# Patient Record
Sex: FEMALE | Race: BLACK OR AFRICAN AMERICAN | NOT HISPANIC OR LATINO | Employment: UNEMPLOYED | URBAN - NONMETROPOLITAN AREA
[De-identification: names, ages, dates, MRNs, and addresses within clinical notes are randomized per-mention and may not be internally consistent; named-entity substitution may affect disease eponyms.]

---

## 2023-04-28 ENCOUNTER — ANESTHESIA EVENT (OUTPATIENT)
Dept: PERIOP | Facility: HOSPITAL | Age: 18
End: 2023-04-28
Payer: COMMERCIAL

## 2023-04-28 ENCOUNTER — HOSPITAL ENCOUNTER (EMERGENCY)
Facility: HOSPITAL | Age: 18
Discharge: HOME | End: 2023-04-29
Attending: EMERGENCY MEDICINE
Payer: COMMERCIAL

## 2023-04-28 ENCOUNTER — APPOINTMENT (OUTPATIENT)
Dept: ULTRASOUND IMAGING | Facility: HOSPITAL | Age: 18
End: 2023-04-28
Payer: COMMERCIAL

## 2023-04-28 ENCOUNTER — PREP FOR SURGERY (OUTPATIENT)
Dept: OTHER | Facility: HOSPITAL | Age: 18
End: 2023-04-28
Payer: COMMERCIAL

## 2023-04-28 ENCOUNTER — ANESTHESIA (OUTPATIENT)
Dept: PERIOP | Facility: HOSPITAL | Age: 18
End: 2023-04-28
Payer: COMMERCIAL

## 2023-04-28 DIAGNOSIS — O36.80X0 PREGNANCY OF UNKNOWN ANATOMIC LOCATION: ICD-10-CM

## 2023-04-28 DIAGNOSIS — O00.101 RIGHT TUBAL PREGNANCY WITHOUT INTRAUTERINE PREGNANCY: ICD-10-CM

## 2023-04-28 DIAGNOSIS — K66.1 HEMOPERITONEUM DUE TO RUPTURE OF RIGHT TUBAL ECTOPIC PREGNANCY: ICD-10-CM

## 2023-04-28 DIAGNOSIS — R10.9 ABDOMINAL PAIN DURING PREGNANCY IN FIRST TRIMESTER: Primary | ICD-10-CM

## 2023-04-28 DIAGNOSIS — D64.9 ANEMIA, UNSPECIFIED TYPE: ICD-10-CM

## 2023-04-28 DIAGNOSIS — O36.80X0 PREGNANCY OF UNKNOWN ANATOMIC LOCATION: Primary | ICD-10-CM

## 2023-04-28 DIAGNOSIS — K66.1 HEMOPERITONEUM: ICD-10-CM

## 2023-04-28 DIAGNOSIS — O26.891 ABDOMINAL PAIN DURING PREGNANCY IN FIRST TRIMESTER: Primary | ICD-10-CM

## 2023-04-28 DIAGNOSIS — O00.101 HEMOPERITONEUM DUE TO RUPTURE OF RIGHT TUBAL ECTOPIC PREGNANCY: ICD-10-CM

## 2023-04-28 LAB
ABO GROUP BLD: NORMAL
ABO GROUP BLD: NORMAL
ALBUMIN SERPL-MCNC: 4 G/DL (ref 3.5–5.2)
ALBUMIN/GLOB SERPL: 1.4 G/DL
ALP SERPL-CCNC: 69 U/L (ref 39–117)
ALT SERPL W P-5'-P-CCNC: <5 U/L (ref 1–33)
ANION GAP SERPL CALCULATED.3IONS-SCNC: 10.8 MMOL/L (ref 5–15)
AST SERPL-CCNC: 10 U/L (ref 1–32)
B-HCG UR QL: POSITIVE
BASOPHILS # BLD AUTO: 0.08 10*3/MM3 (ref 0–0.2)
BASOPHILS NFR BLD AUTO: 0.6 % (ref 0–1.5)
BH BB BLOOD EXPIRATION DATE: NORMAL
BH BB BLOOD TYPE BARCODE: 5100
BH BB DISPENSE STATUS: NORMAL
BH BB PRODUCT CODE: NORMAL
BH BB UNIT NUMBER: NORMAL
BILIRUB SERPL-MCNC: 0.3 MG/DL (ref 0–1.2)
BILIRUB UR QL STRIP: NEGATIVE
BLD GP AB SCN SERPL QL: NEGATIVE
BUN SERPL-MCNC: 5 MG/DL (ref 6–20)
BUN/CREAT SERPL: 9.1 (ref 7–25)
CALCIUM SPEC-SCNC: 8.5 MG/DL (ref 8.6–10.5)
CHLORIDE SERPL-SCNC: 104 MMOL/L (ref 98–107)
CLARITY UR: CLEAR
CO2 SERPL-SCNC: 21.2 MMOL/L (ref 22–29)
COLOR UR: YELLOW
CREAT SERPL-MCNC: 0.55 MG/DL (ref 0.57–1)
CROSSMATCH INTERPRETATION: NORMAL
DEPRECATED RDW RBC AUTO: 38.2 FL (ref 37–54)
EGFRCR SERPLBLD CKD-EPI 2021: 134.8 ML/MIN/1.73
EOSINOPHIL # BLD AUTO: 0.22 10*3/MM3 (ref 0–0.4)
EOSINOPHIL NFR BLD AUTO: 1.6 % (ref 0.3–6.2)
ERYTHROCYTE [DISTWIDTH] IN BLOOD BY AUTOMATED COUNT: 12.5 % (ref 12.3–15.4)
GLOBULIN UR ELPH-MCNC: 2.8 GM/DL
GLUCOSE SERPL-MCNC: 114 MG/DL (ref 65–99)
GLUCOSE UR STRIP-MCNC: NEGATIVE MG/DL
HCG INTACT+B SERPL-ACNC: 1739 MIU/ML
HCT VFR BLD AUTO: 26.5 % (ref 34–46.6)
HGB BLD-MCNC: 9.4 G/DL (ref 12–15.9)
HGB UR QL STRIP.AUTO: NEGATIVE
HOLD SPECIMEN: NORMAL
HOLD SPECIMEN: NORMAL
IMM GRANULOCYTES # BLD AUTO: 0.03 10*3/MM3 (ref 0–0.05)
IMM GRANULOCYTES NFR BLD AUTO: 0.2 % (ref 0–0.5)
KETONES UR QL STRIP: NEGATIVE
LEUKOCYTE ESTERASE UR QL STRIP.AUTO: NEGATIVE
LIPASE SERPL-CCNC: 23 U/L (ref 13–60)
LYMPHOCYTES # BLD AUTO: 3.84 10*3/MM3 (ref 0.7–3.1)
LYMPHOCYTES NFR BLD AUTO: 28.8 % (ref 19.6–45.3)
MCH RBC QN AUTO: 30 PG (ref 26.6–33)
MCHC RBC AUTO-ENTMCNC: 35.5 G/DL (ref 31.5–35.7)
MCV RBC AUTO: 84.7 FL (ref 79–97)
MONOCYTES # BLD AUTO: 1.02 10*3/MM3 (ref 0.1–0.9)
MONOCYTES NFR BLD AUTO: 7.6 % (ref 5–12)
NEUTROPHILS NFR BLD AUTO: 61.2 % (ref 42.7–76)
NEUTROPHILS NFR BLD AUTO: 8.16 10*3/MM3 (ref 1.7–7)
NITRITE UR QL STRIP: NEGATIVE
NRBC BLD AUTO-RTO: 0 /100 WBC (ref 0–0.2)
PH UR STRIP.AUTO: 6.5 [PH] (ref 5–8)
PLATELET # BLD AUTO: 253 10*3/MM3 (ref 140–450)
PMV BLD AUTO: 9.4 FL (ref 6–12)
POTASSIUM SERPL-SCNC: 3.4 MMOL/L (ref 3.5–5.2)
PROT SERPL-MCNC: 6.8 G/DL (ref 6–8.5)
PROT UR QL STRIP: NEGATIVE
RBC # BLD AUTO: 3.13 10*6/MM3 (ref 3.77–5.28)
RH BLD: POSITIVE
RH BLD: POSITIVE
SODIUM SERPL-SCNC: 136 MMOL/L (ref 136–145)
SP GR UR STRIP: <=1.005 (ref 1–1.03)
T&S EXPIRATION DATE: NORMAL
UNIT  ABO: NORMAL
UNIT  RH: NORMAL
UROBILINOGEN UR QL STRIP: NORMAL
WBC NRBC COR # BLD: 13.35 10*3/MM3 (ref 3.4–10.8)
WHOLE BLOOD HOLD COAG: NORMAL
WHOLE BLOOD HOLD SPECIMEN: NORMAL

## 2023-04-28 PROCEDURE — 25010000002 PROPOFOL 200 MG/20ML EMULSION: Performed by: NURSE ANESTHETIST, CERTIFIED REGISTERED

## 2023-04-28 PROCEDURE — 81025 URINE PREGNANCY TEST: CPT | Performed by: EMERGENCY MEDICINE

## 2023-04-28 PROCEDURE — 86920 COMPATIBILITY TEST SPIN: CPT

## 2023-04-28 PROCEDURE — 86901 BLOOD TYPING SEROLOGIC RH(D): CPT | Performed by: EMERGENCY MEDICINE

## 2023-04-28 PROCEDURE — 76817 TRANSVAGINAL US OBSTETRIC: CPT

## 2023-04-28 PROCEDURE — 86900 BLOOD TYPING SEROLOGIC ABO: CPT | Performed by: EMERGENCY MEDICINE

## 2023-04-28 PROCEDURE — 25010000002 HYDROMORPHONE PER 4 MG: Performed by: NURSE ANESTHETIST, CERTIFIED REGISTERED

## 2023-04-28 PROCEDURE — 80053 COMPREHEN METABOLIC PANEL: CPT | Performed by: EMERGENCY MEDICINE

## 2023-04-28 PROCEDURE — 81003 URINALYSIS AUTO W/O SCOPE: CPT | Performed by: EMERGENCY MEDICINE

## 2023-04-28 PROCEDURE — 25010000002 SUCCINYLCHOLINE PER 20 MG: Performed by: NURSE ANESTHETIST, CERTIFIED REGISTERED

## 2023-04-28 PROCEDURE — 86901 BLOOD TYPING SEROLOGIC RH(D): CPT

## 2023-04-28 PROCEDURE — 99285 EMERGENCY DEPT VISIT HI MDM: CPT

## 2023-04-28 PROCEDURE — 25010000002 FENTANYL CITRATE (PF) 100 MCG/2ML SOLUTION: Performed by: NURSE ANESTHETIST, CERTIFIED REGISTERED

## 2023-04-28 PROCEDURE — 85025 COMPLETE CBC W/AUTO DIFF WBC: CPT | Performed by: EMERGENCY MEDICINE

## 2023-04-28 PROCEDURE — 84702 CHORIONIC GONADOTROPIN TEST: CPT | Performed by: EMERGENCY MEDICINE

## 2023-04-28 PROCEDURE — 86900 BLOOD TYPING SEROLOGIC ABO: CPT

## 2023-04-28 PROCEDURE — 83690 ASSAY OF LIPASE: CPT | Performed by: EMERGENCY MEDICINE

## 2023-04-28 PROCEDURE — 25010000002 DEXAMETHASONE PER 1 MG: Performed by: NURSE ANESTHETIST, CERTIFIED REGISTERED

## 2023-04-28 PROCEDURE — 86850 RBC ANTIBODY SCREEN: CPT | Performed by: EMERGENCY MEDICINE

## 2023-04-28 RX ORDER — HYDROMORPHONE HCL 110MG/55ML
PATIENT CONTROLLED ANALGESIA SYRINGE INTRAVENOUS AS NEEDED
Status: DISCONTINUED | OUTPATIENT
Start: 2023-04-28 | End: 2023-04-28 | Stop reason: SURG

## 2023-04-28 RX ORDER — IBUPROFEN 800 MG/1
800 TABLET ORAL EVERY 6 HOURS PRN
Qty: 30 TABLET | Refills: 0 | Status: SHIPPED | OUTPATIENT
Start: 2023-04-28

## 2023-04-28 RX ORDER — PROPOFOL 10 MG/ML
INJECTION, EMULSION INTRAVENOUS AS NEEDED
Status: DISCONTINUED | OUTPATIENT
Start: 2023-04-28 | End: 2023-04-28 | Stop reason: SURG

## 2023-04-28 RX ORDER — SODIUM CHLORIDE 0.9 % (FLUSH) 0.9 %
10 SYRINGE (ML) INJECTION AS NEEDED
Status: DISCONTINUED | OUTPATIENT
Start: 2023-04-28 | End: 2023-04-29 | Stop reason: HOSPADM

## 2023-04-28 RX ORDER — SUCCINYLCHOLINE CHLORIDE 20 MG/ML
INJECTION INTRAMUSCULAR; INTRAVENOUS AS NEEDED
Status: DISCONTINUED | OUTPATIENT
Start: 2023-04-28 | End: 2023-04-28 | Stop reason: SURG

## 2023-04-28 RX ORDER — BUPIVACAINE HCL/0.9 % NACL/PF 0.125 %
PLASTIC BAG, INJECTION (ML) EPIDURAL AS NEEDED
Status: DISCONTINUED | OUTPATIENT
Start: 2023-04-28 | End: 2023-04-28 | Stop reason: SURG

## 2023-04-28 RX ORDER — SODIUM CHLORIDE 9 MG/ML
40 INJECTION, SOLUTION INTRAVENOUS AS NEEDED
Status: DISCONTINUED | OUTPATIENT
Start: 2023-04-28 | End: 2023-04-28 | Stop reason: HOSPADM

## 2023-04-28 RX ORDER — OXYCODONE HYDROCHLORIDE 5 MG/1
5 TABLET ORAL EVERY 4 HOURS PRN
Qty: 10 TABLET | Refills: 0 | Status: SHIPPED | OUTPATIENT
Start: 2023-04-28

## 2023-04-28 RX ORDER — NEOSTIGMINE METHYLSULFATE 5 MG/5 ML
SYRINGE (ML) INTRAVENOUS AS NEEDED
Status: DISCONTINUED | OUTPATIENT
Start: 2023-04-28 | End: 2023-04-28 | Stop reason: SURG

## 2023-04-28 RX ORDER — BUPIVACAINE HYDROCHLORIDE 2.5 MG/ML
INJECTION, SOLUTION EPIDURAL; INFILTRATION; INTRACAUDAL AS NEEDED
Status: DISCONTINUED | OUTPATIENT
Start: 2023-04-28 | End: 2023-04-28 | Stop reason: HOSPADM

## 2023-04-28 RX ORDER — SODIUM CHLORIDE 0.9 % (FLUSH) 0.9 %
3 SYRINGE (ML) INJECTION EVERY 12 HOURS SCHEDULED
Status: DISCONTINUED | OUTPATIENT
Start: 2023-04-28 | End: 2023-04-28 | Stop reason: HOSPADM

## 2023-04-28 RX ORDER — SODIUM CHLORIDE 0.9 % (FLUSH) 0.9 %
3 SYRINGE (ML) INJECTION EVERY 12 HOURS SCHEDULED
Status: CANCELLED | OUTPATIENT
Start: 2023-04-28

## 2023-04-28 RX ORDER — FENTANYL CITRATE 50 UG/ML
INJECTION, SOLUTION INTRAMUSCULAR; INTRAVENOUS AS NEEDED
Status: DISCONTINUED | OUTPATIENT
Start: 2023-04-28 | End: 2023-04-28 | Stop reason: SURG

## 2023-04-28 RX ORDER — ROCURONIUM BROMIDE 10 MG/ML
INJECTION, SOLUTION INTRAVENOUS AS NEEDED
Status: DISCONTINUED | OUTPATIENT
Start: 2023-04-28 | End: 2023-04-28 | Stop reason: SURG

## 2023-04-28 RX ORDER — ACETAMINOPHEN 500 MG
1000 TABLET ORAL EVERY 6 HOURS PRN
Qty: 60 TABLET | Refills: 0 | Status: SHIPPED | OUTPATIENT
Start: 2023-04-28

## 2023-04-28 RX ORDER — SODIUM CHLORIDE 9 MG/ML
40 INJECTION, SOLUTION INTRAVENOUS AS NEEDED
Status: CANCELLED | OUTPATIENT
Start: 2023-04-28

## 2023-04-28 RX ORDER — SODIUM CHLORIDE 0.9 % (FLUSH) 0.9 %
10 SYRINGE (ML) INJECTION AS NEEDED
Status: CANCELLED | OUTPATIENT
Start: 2023-04-28

## 2023-04-28 RX ORDER — DEXAMETHASONE SODIUM PHOSPHATE 4 MG/ML
INJECTION, SOLUTION INTRA-ARTICULAR; INTRALESIONAL; INTRAMUSCULAR; INTRAVENOUS; SOFT TISSUE AS NEEDED
Status: DISCONTINUED | OUTPATIENT
Start: 2023-04-28 | End: 2023-04-28 | Stop reason: SURG

## 2023-04-28 RX ORDER — LIDOCAINE HYDROCHLORIDE 20 MG/ML
INJECTION, SOLUTION INTRAVENOUS AS NEEDED
Status: DISCONTINUED | OUTPATIENT
Start: 2023-04-28 | End: 2023-04-28 | Stop reason: SURG

## 2023-04-28 RX ORDER — ACETAMINOPHEN 500 MG
1000 TABLET ORAL ONCE
Status: COMPLETED | OUTPATIENT
Start: 2023-04-28 | End: 2023-04-28

## 2023-04-28 RX ORDER — SODIUM CHLORIDE 0.9 % (FLUSH) 0.9 %
10 SYRINGE (ML) INJECTION AS NEEDED
Status: DISCONTINUED | OUTPATIENT
Start: 2023-04-28 | End: 2023-04-28 | Stop reason: HOSPADM

## 2023-04-28 RX ORDER — ACETAMINOPHEN AND CODEINE PHOSPHATE 300; 30 MG/1; MG/1
2 TABLET ORAL EVERY 4 HOURS PRN
Status: DISCONTINUED | OUTPATIENT
Start: 2023-04-28 | End: 2023-04-29 | Stop reason: HOSPADM

## 2023-04-28 RX ORDER — ONDANSETRON 2 MG/ML
4 INJECTION INTRAMUSCULAR; INTRAVENOUS ONCE AS NEEDED
Status: DISCONTINUED | OUTPATIENT
Start: 2023-04-28 | End: 2023-04-29 | Stop reason: HOSPADM

## 2023-04-28 RX ORDER — SODIUM CHLORIDE, SODIUM LACTATE, POTASSIUM CHLORIDE, CALCIUM CHLORIDE 600; 310; 30; 20 MG/100ML; MG/100ML; MG/100ML; MG/100ML
INJECTION, SOLUTION INTRAVENOUS CONTINUOUS PRN
Status: DISCONTINUED | OUTPATIENT
Start: 2023-04-28 | End: 2023-04-28 | Stop reason: SURG

## 2023-04-28 RX ORDER — DICYCLOMINE HYDROCHLORIDE 10 MG/1
10 CAPSULE ORAL
COMMUNITY

## 2023-04-28 RX ADMIN — SUCCINYLCHOLINE CHLORIDE 100 MG: 20 INJECTION, SOLUTION INTRAMUSCULAR; INTRAVENOUS at 19:17

## 2023-04-28 RX ADMIN — Medication 200 MCG: at 19:38

## 2023-04-28 RX ADMIN — Medication 3 MG: at 20:02

## 2023-04-28 RX ADMIN — SODIUM CHLORIDE, POTASSIUM CHLORIDE, SODIUM LACTATE AND CALCIUM CHLORIDE: 600; 310; 30; 20 INJECTION, SOLUTION INTRAVENOUS at 19:11

## 2023-04-28 RX ADMIN — DEXAMETHASONE SODIUM PHOSPHATE 8 MG: 4 INJECTION, SOLUTION INTRAMUSCULAR; INTRAVENOUS at 19:24

## 2023-04-28 RX ADMIN — SODIUM CHLORIDE 1000 ML: 9 INJECTION, SOLUTION INTRAVENOUS at 16:12

## 2023-04-28 RX ADMIN — ACETAMINOPHEN 1000 MG: 500 TABLET, FILM COATED ORAL at 16:12

## 2023-04-28 RX ADMIN — ROCURONIUM BROMIDE 5 MG: 10 INJECTION INTRAVENOUS at 19:17

## 2023-04-28 RX ADMIN — Medication 200 MCG: at 19:24

## 2023-04-28 RX ADMIN — ROCURONIUM BROMIDE 25 MG: 10 INJECTION INTRAVENOUS at 19:23

## 2023-04-28 RX ADMIN — HYDROMORPHONE HYDROCHLORIDE 1 MG: 2 INJECTION, SOLUTION INTRAMUSCULAR; INTRAVENOUS; SUBCUTANEOUS at 19:33

## 2023-04-28 RX ADMIN — HYDROMORPHONE HYDROCHLORIDE 1 MG: 2 INJECTION, SOLUTION INTRAMUSCULAR; INTRAVENOUS; SUBCUTANEOUS at 19:57

## 2023-04-28 RX ADMIN — Medication 200 MCG: at 19:42

## 2023-04-28 RX ADMIN — FENTANYL CITRATE 100 MCG: 50 INJECTION INTRAMUSCULAR; INTRAVENOUS at 19:17

## 2023-04-28 RX ADMIN — LIDOCAINE HYDROCHLORIDE 80 MG: 20 INJECTION, SOLUTION INTRAVENOUS at 19:17

## 2023-04-28 RX ADMIN — GLYCOPYRROLATE 0.4 MG: 0.2 INJECTION, SOLUTION INTRAMUSCULAR; INTRAVENOUS at 20:02

## 2023-04-28 RX ADMIN — PROPOFOL 150 MG: 10 INJECTION, EMULSION INTRAVENOUS at 19:17

## 2023-04-28 NOTE — ANESTHESIA PREPROCEDURE EVALUATION
Anesthesia Evaluation     Patient summary reviewed and Nursing notes reviewed   NPO Solid Status: > 8 hours  NPO Liquid Status: > 4 hours           Airway   Mallampati: II  TM distance: >3 FB  Neck ROM: full  No difficulty expected  Dental - normal exam     Pulmonary     breath sounds clear to auscultation  (+) asthma,  Cardiovascular - negative cardio ROS    Rhythm: regular  Rate: normal        Neuro/Psych- negative ROS  GI/Hepatic/Renal/Endo - negative ROS     Musculoskeletal (-) negative ROS    Abdominal    Substance History - negative use     OB/GYN    (+) Pregnant (ruptured ectopic),         Other - negative ROS                     Anesthesia Plan    ASA 2 - emergent     general   Rapid sequence  intravenous induction     Anesthetic plan, risks, benefits, and alternatives have been provided, discussed and informed consent has been obtained with: patient.  Pre-procedure education provided  Plan discussed with CRNA.        CODE STATUS:

## 2023-04-28 NOTE — H&P
GYNECOLOGY HISTORY AND PHYSICAL    CHIEF COMPLAINT: Abdominal pain, early pregnancy    DIAGNOSIS:  Pregnancy of unknown location  Suspected ruptured ectopic pregnancy    ASSESSMENT/PLAN     18 y.o.  female who presents with abdominal/ pelvic pain of approximately 3 days duration in early pregnancy, with ultrasound findings concerning for possible ruptured ectopic pregnancy.    # Pregnancy of unknown location  - Initially presented to outside ED 23, Hcg 937, H/H 12.3/35, no evidence of intrauterine pregnancy or adnexal mass, small physiologic free fluid present. Discharged with instructions to follow up in 48 hours for Hcg.  - Presented to [facility] ED today with continued abdominal pain. Hcg has risen appropriately (1739), however H/H has dropped to 9.4/26.5. Ultrasound reveals a small, irregular fluid collection in the uterus, possibly consistent with an early GS, however no FP or YS is seen. Large complex fluid collection now present in the cul de sac, concerning for hemoperitoneum.  - I discussed the risks/ benefits with the patient extensively - given concern for hemoperitoneum and her ongoing pain, I suspect ectopic pregnancy and therefore recommend diagnostic laparoscopy with possible unilateral salpingectomy /oophorectomy as indicated. We discussed the possibility that a pregnancy is not located on diagnostic laparoscopy, in which case she will be admitted for obs overnight and consideration of methotrexate. We also discussed the possibility that this is still an early intrauterine pregnancy, in which case there are small risks of exposure to anesthesia for the fetus. However, the risks of ruptured ectopic outweigh these risks and thus I recommend proceeding to the OR. Magnolia is in agreement with this plan and would like to proceed with surgery. All questions answered, prep for case placed and OR team made aware.    HISTORY OF PRESENT ILLNESS     18 y.o.  female who presents with abdominal pain of about  3 days duration. She initially presented to the [place] ED in [place] on 23 with abdominal pain. She reported an LMP of 3/26/23, putting her at approximately 4 weeks gestation. She indicated to ER providers that she did have unprotected intercourse about one month ago and used Plan B at that time. This was not a planned pregnancy. Her UPT was positive and an Hcg was obtained, which resulted at 937. Ultrasound did not reveal an IUP, adnexal masses or significant free fluid (trace physiologic free fluid noted). She was hemodynamically stable and thus discharged with return precautions and instructions to repeat her Hcg in 48 hours. Today, she reports her pain has been ongoing. She has not had any vaginal bleeding throughout the last few days. Her pain is in her lower abdomen, on the right side more than the left.     REVIEW OF SYSTEMS: A complete review of systems was performed and was specifically negative for headache, changes in vision, RUQ pain, shortness of breath, chest pain, lower extremity edema and dysuria.     HISTORY:  Obstetrical History:  OB History    Para Term  AB Living   1             SAB IAB Ectopic Molar Multiple Live Births                    # Outcome Date GA Lbr Rodrick/2nd Weight Sex Delivery Anes PTL Lv   1 Current                Past Medical History:  History reviewed. No pertinent past medical history.    Past Surgical History:  History reviewed. No pertinent surgical history.    Social History:       Family History  History reviewed. No pertinent family history.     Allergies: No Known Allergies    No current facility-administered medications on file prior to encounter.     Current Outpatient Medications on File Prior to Encounter   Medication Sig Dispense Refill   • dicyclomine (BENTYL) 10 MG capsule Take 1 capsule by mouth 4 (Four) Times a Day Before Meals & at Bedtime.         OBJECTIVE   VITALS:  Temp:  [97.8 °F (36.6 °C)] 97.8 °F (36.6 °C)  Heart Rate:  [108-133]  108  Resp:  [18] 18  BP: (105-112)/(58-65) 105/58    PHYSICAL EXAM:  General Appearance: alert, pleasant, appears stated age, interactive and cooperative  Head: normocephalic, without obvious abnormality and atraumatic  Neck: suppple, normal range of motion  Lungs: respirations regular, even and unlabored, clear to auscultation bilaterally   Heart: regular rhythm and normal rate, no murmur, gallop, or rubs  Abdomen: no masses, soft, non-distended. Tender to palpation in bilateral lower quadrants, R > L, with (+) rebound tenderness.  Extremities: moves extremities well, no edema, no cyanosis and no redness  Skin: no bleeding, bruising or rash and no lesions noted  Neurologic: Cranial Nerves 2 - 12 grossly intact   Psych: normal mood and affect, oriented to person, time and place, thought content organized and appropriate judgment    DIAGNOSTIC STUDIES:  Results from last 7 days   Lab Units 04/28/23  1513   WBC 10*3/mm3 13.35*   HEMOGLOBIN g/dL 9.4*   HEMATOCRIT % 26.5*   PLATELETS 10*3/mm3 253   SODIUM mmol/L 136   POTASSIUM mmol/L 3.4*   CHLORIDE mmol/L 104   CO2 mmol/L 21.2*   BUN mg/dL 5*   CREATININE mg/dL 0.55*   GLUCOSE mg/dL 114*   CALCIUM mg/dL 8.5*   AST (SGOT) U/L 10   ALT (SGPT) U/L <5       Recent Results (from the past 24 hour(s))   Comprehensive Metabolic Panel    Collection Time: 04/28/23  3:13 PM    Specimen: Blood   Result Value Ref Range    Glucose 114 (H) 65 - 99 mg/dL    BUN 5 (L) 6 - 20 mg/dL    Creatinine 0.55 (L) 0.57 - 1.00 mg/dL    Sodium 136 136 - 145 mmol/L    Potassium 3.4 (L) 3.5 - 5.2 mmol/L    Chloride 104 98 - 107 mmol/L    CO2 21.2 (L) 22.0 - 29.0 mmol/L    Calcium 8.5 (L) 8.6 - 10.5 mg/dL    Total Protein 6.8 6.0 - 8.5 g/dL    Albumin 4.0 3.5 - 5.2 g/dL    ALT (SGPT) <5 1 - 33 U/L    AST (SGOT) 10 1 - 32 U/L    Alkaline Phosphatase 69 39 - 117 U/L    Total Bilirubin 0.3 0.0 - 1.2 mg/dL    Globulin 2.8 gm/dL    A/G Ratio 1.4 g/dL    BUN/Creatinine Ratio 9.1 7.0 - 25.0    Anion Gap  10.8 5.0 - 15.0 mmol/L    eGFR 134.8 >60.0 mL/min/1.73   Lipase    Collection Time: 04/28/23  3:13 PM    Specimen: Blood   Result Value Ref Range    Lipase 23 13 - 60 U/L   Urinalysis With Microscopic If Indicated (No Culture) - Urine, Clean Catch    Collection Time: 04/28/23  3:13 PM    Specimen: Urine, Clean Catch   Result Value Ref Range    Color, UA Yellow Yellow, Straw    Appearance, UA Clear Clear    pH, UA 6.5 5.0 - 8.0    Specific Gravity, UA <=1.005 1.005 - 1.030    Glucose, UA Negative Negative    Ketones, UA Negative Negative    Bilirubin, UA Negative Negative    Blood, UA Negative Negative    Protein, UA Negative Negative    Leuk Esterase, UA Negative Negative    Nitrite, UA Negative Negative    Urobilinogen, UA 0.2 E.U./dL 0.2 - 1.0 E.U./dL   Pregnancy, Urine - Urine, Clean Catch    Collection Time: 04/28/23  3:13 PM    Specimen: Urine, Clean Catch   Result Value Ref Range    HCG, Urine QL Positive (A) Negative   Green Top (Gel)    Collection Time: 04/28/23  3:13 PM   Result Value Ref Range    Extra Tube Hold for add-ons.    Lavender Top    Collection Time: 04/28/23  3:13 PM   Result Value Ref Range    Extra Tube hold for add-on    Gold Top - SST    Collection Time: 04/28/23  3:13 PM   Result Value Ref Range    Extra Tube Hold for add-ons.    Light Blue Top    Collection Time: 04/28/23  3:13 PM   Result Value Ref Range    Extra Tube Hold for add-ons.    CBC Auto Differential    Collection Time: 04/28/23  3:13 PM    Specimen: Blood   Result Value Ref Range    WBC 13.35 (H) 3.40 - 10.80 10*3/mm3    RBC 3.13 (L) 3.77 - 5.28 10*6/mm3    Hemoglobin 9.4 (L) 12.0 - 15.9 g/dL    Hematocrit 26.5 (L) 34.0 - 46.6 %    MCV 84.7 79.0 - 97.0 fL    MCH 30.0 26.6 - 33.0 pg    MCHC 35.5 31.5 - 35.7 g/dL    RDW 12.5 12.3 - 15.4 %    RDW-SD 38.2 37.0 - 54.0 fl    MPV 9.4 6.0 - 12.0 fL    Platelets 253 140 - 450 10*3/mm3    Neutrophil % 61.2 42.7 - 76.0 %    Lymphocyte % 28.8 19.6 - 45.3 %    Monocyte % 7.6 5.0 - 12.0 %     Eosinophil % 1.6 0.3 - 6.2 %    Basophil % 0.6 0.0 - 1.5 %    Immature Grans % 0.2 0.0 - 0.5 %    Neutrophils, Absolute 8.16 (H) 1.70 - 7.00 10*3/mm3    Lymphocytes, Absolute 3.84 (H) 0.70 - 3.10 10*3/mm3    Monocytes, Absolute 1.02 (H) 0.10 - 0.90 10*3/mm3    Eosinophils, Absolute 0.22 0.00 - 0.40 10*3/mm3    Basophils, Absolute 0.08 0.00 - 0.20 10*3/mm3    Immature Grans, Absolute 0.03 0.00 - 0.05 10*3/mm3    nRBC 0.0 0.0 - 0.2 /100 WBC   hCG, Quantitative, Pregnancy    Collection Time: 04/28/23  3:13 PM    Specimen: Blood   Result Value Ref Range    HCG Quantitative 1,739.00 mIU/mL       Kacie Briggs MD   Obstetrics and Gynecology  [facility]

## 2023-04-28 NOTE — ANESTHESIA PROCEDURE NOTES
Airway  Urgency: elective    Date/Time: 4/28/2023 7:18 PM  Airway not difficult    General Information and Staff    Patient location during procedure: OR  CRNA/CAA: Jadon Tapia CRNA    Indications and Patient Condition  Indications for airway management: airway protection    Preoxygenated: yes  Mask difficulty assessment: 0 - not attempted    Final Airway Details  Final airway type: endotracheal airway      Successful airway: ETT  Cuffed: yes   Successful intubation technique: direct laryngoscopy and RSI  Endotracheal tube insertion site: oral  Blade: Byers  Blade size: 2  ETT size (mm): 7.0  Cormack-Lehane Classification: grade I - full view of glottis  Placement verified by: chest auscultation and capnometry   Measured from: teeth  ETT/EBT  to teeth (cm): 21  Number of attempts at approach: 1  Assessment: lips, teeth, and gum same as pre-op and atraumatic intubation

## 2023-04-28 NOTE — ED PROVIDER NOTES
HPI: Magnolia Palumbo is a 18 y.o. female who presents to the emergency department complaining of abdominal cramping in pregnancy.  She states that she is about 5 weeks gestation, G1.  She notes that for the last few days she has had severe lower abdominal cramping.  She was seen at the [place] ER 2 days ago, was told to follow-up with OB/GYN but the cramping got worse so she came here.  She has had no bleeding, urinary complaints, fevers, chills, shortness of breath or other complaints.      REVIEW OF SYSTEMS: All other systems reviewed and are negative     PAST MEDICAL HISTORY:   History reviewed. No pertinent past medical history.     FAMILY HISTORY:   History reviewed. No pertinent family history.     SOCIAL HISTORY:   Social History     Socioeconomic History   • Marital status: Single        SURGICAL HISTORY:   History reviewed. No pertinent surgical history.     ALLERGIES: Patient has no known allergies.       PHYSICAL EXAM:   VITAL SIGNS:   Vitals:    04/28/23 1732   BP: 105/58   Pulse:    Resp:    Temp:    SpO2:       CONSTITUTIONAL: Awake, well appearing, nontoxic   HENT: Atraumatic, normocephalic, oral mucosa moist, airway patent. Nares patent without drainage. External ears normal.   EYES: Conjunctivae clear, EOMI, PERRL   NECK: Trachea midline, nontender, supple   CARDIOVASCULAR: Tachycardic, Normal rhythm.  PULMONARY/CHEST: Normal work of breathing. Clear to auscultation, no rhonchi, wheezes, or rales.  ABDOMINAL: Nondistended, soft, mild diffuse tenderness, no rebound or guarding.  NEUROLOGIC: Nonfocal, moves all four extremities, no gross sensory or motor deficits.   EXTREMITIES: No clubbing, cyanosis, or edema   SKIN: Warm, Dry, No erythema, No rash       ED COURSE / MEDICAL DECISION MAKING:     Magnolia Palumbo is a 18 y.o. female who presents to the emergency department for evaluation of abdominal cramping in pregnancy. Well developed, well nourished young female in no distress with exam as above.  Her  vital signs are normal other than some mild tachycardia.  Her abdominal exam is notable for mild diffuse tenderness.  I was able to review results from the ER 2 nights ago, she had ultrasound that revealed gestational sac and no fetal pole. Will check labs, UA, repeat ultrasound. Will give IV fluids. Disposition pending.    Differential diagnosis includes abdominal cramping in pregnancy, dehydration, UTI, miscarriage among other etiologies.    1755  Blood work notable for appropriately rising hCG level precipitous drop in H&H compared to 48 hours ago.  Ultrasound reveals no intrauterine pregnancy, moderate amount of what appears to be hemoperitoneum.  Discussed the case and findings with Dr. Briggs, she is going to see patient in the ER.  Disposition pending.    1820  Patient has been evaluated by Dr. Briggs, given the clinical picture and work-up concern for ruptured ectopic, she is planning to take patient to the OR for laparoscopy.    35 minutes of critical care provided. This time excludes other billable procedures. Time does include preparation of documents, medical consultations, review of old records, and direct bedside care. Patient is at high risk for life-threatening deterioration due to presumed ruptured ectopic, anemia.       Final diagnoses:   Abdominal pain during pregnancy in first trimester   Hemoperitoneum   Anemia, unspecified type        Jonathan Jimenez MD  04/28/23 1825

## 2023-04-29 VITALS
DIASTOLIC BLOOD PRESSURE: 60 MMHG | WEIGHT: 150 LBS | SYSTOLIC BLOOD PRESSURE: 110 MMHG | TEMPERATURE: 97.7 F | RESPIRATION RATE: 16 BRPM | BODY MASS INDEX: 24.99 KG/M2 | HEIGHT: 65 IN | HEART RATE: 105 BPM | OXYGEN SATURATION: 100 %

## 2023-04-29 NOTE — OP NOTE
Julio Palumbo  : [id number]  CSN: [id number]  Date: 2023    Operative Note    Pre-op Diagnosis:  Pregnancy of unknown location   Post-op Diagnosis:  Right tubal ectopic pregnancy   Procedure: Diagnostic laparoscopy, right salpingectomy   Surgeon: Kacie Briggs MD    Surgical assistant: Krystal Daniels was responsible for performing the following activities: Placing Dressing and Held/Positioned Camera and their skilled assistance was necessary for the success of this case.   Anesthesia Staff: CRNA: Jadon Tapia CRNA    Anesthesia: General   OR Staff: Circulator: Coleen Byers RN  Scrub Person: Krystal Daniels   Specimens:  None   Estimated Blood Loss: Approximately 220 cc hemoperitoneum, 5 cc additional EBL from the procedure   Complications: None     Findings:  On entering the abdomen, hemoperitoneum was identified and evacuated. Normal appearing uterus, left fallopian tube and bilateral ovaries. The right fallopian tube was noted to be intact but dilated, with slow, active bleeding draining from the fimbriae. Normal liver edge.    Indications:   Magnolia Palumbo is a 18 y.o.  who presented to the Emergency Department with abdominal pain in early pregnancy. Her workup was notable for an a drop in her blood counts as well as imaging concerning for hemoperitoneum, with no intrauterine pregnancy identified. She was counseled regarding concern for ruptured ectopic pregnancy and offered diagnostic laparoscopy with possible unilateral salpingectomy or oophorectomy and she agreed to proceed.      Procedure:   After the appropriate time out and after adequate dosing of anesthesia, the patient was prepped and draped in the usual sterile fashion. She was placed in the dorsal supine position. A snider catheter had been placed per nursing for drainage during the procedure. A 5 mm infraumbilical incision was made with the scalpel. The abdominal wall was elevated and using a Visiport and the  laparoscope, the abdomen was entered under direct visualization. The abdomen was insufflated with carbon dioxide to a pressure of 15 mmHg. The patient was placed in Trendelenburg position. Two ancillary trocars were placed, a 5 mm trocar in the left lower quadrant and a 10 mm trocar in the right lower quadrant, both lateral to the epigastric vessels, under direct visualization without any complications. The pelvis was explored with the above findings noted. Using the suction  device, the hemoperitoneum was evacuated. The right fallopian tube was then elevated and using the Harmonic scalpel, the underlying mesosalpinx was serially clamped, cauterized and transected to the proximal portion of the tube. The tube was then transected with the Harmonic and placed in the anterior cul de sac. The Harmonic was removed and a specimen retrieval bag was inserted through the right lower quadrant trocar. The specimen was placed in the bag and removed, then handed off the field to be sent to pathology. The abdomen was partially released of carbon dioxide and the site of surgical excision was noted to be hemostatic. The left ancillary and umbilical trocars were then removed under direct visualization and confirmed to be hemostatic. The abdomen was fully desufflated prior to removal of the right ancillary trocar. The trocar sites were all made hemostatic with minimal Bovie cautery. The skin was then closed at all sites using 4-0 Monocryl in a subcuticular fashion. Surgical glue was placed. All instrument and sponge counts were correct at the end of the procedure. The patient tolerated the procedure well and was taken to the postoperative recovery room in stable condition.     Kacie Briggs MD   Obstetrics and Gynecology  [facility]

## 2023-04-29 NOTE — ANESTHESIA POSTPROCEDURE EVALUATION
Patient: Magnolia Palumbo    Procedure Summary     Date: 04/28/23 Room / Location: [facility] OR 4 /  [facility]    Anesthesia Start: 1911 Anesthesia Stop: 2019    Procedure: DIAGNOSTIC LAPAROSCOPY, right salpingectomy (Right: Abdomen) Diagnosis:       Pregnancy of unknown anatomic location      (Pregnancy of unknown anatomic location [O36.80X0])    Surgeons: Kacie Briggs MD Provider: Jadon Tapia CRNA    Anesthesia Type: general ASA Status: 2 - Emergent          Anesthesia Type: No value filed.    Vitals  Vitals Value Taken Time   /64 04/28/23 2100   Temp 97.7 °F (36.5 °C) 04/28/23 2021   Pulse 89 04/28/23 2102   Resp 18 04/28/23 2045   SpO2 99 % 04/28/23 2102   Vitals shown include unvalidated device data.        Post Anesthesia Care and Evaluation    Patient location during evaluation: PACU  Patient participation: complete - patient participated  Level of consciousness: awake and alert  Pain score: 1  Pain management: adequate    Airway patency: patent  Anesthetic complications: No anesthetic complications  PONV Status: none  Cardiovascular status: acceptable  Respiratory status: acceptable  Hydration status: acceptable  No anesthesia care post op

## 2023-05-02 LAB
BH BB BLOOD EXPIRATION DATE: NORMAL
BH BB BLOOD TYPE BARCODE: 5100
BH BB DISPENSE STATUS: NORMAL
BH BB PRODUCT CODE: NORMAL
BH BB UNIT NUMBER: NORMAL
CROSSMATCH INTERPRETATION: NORMAL
REF LAB TEST METHOD: NORMAL
UNIT  ABO: NORMAL
UNIT  RH: NORMAL

## 2023-05-06 ENCOUNTER — HOSPITAL ENCOUNTER (EMERGENCY)
Facility: HOSPITAL | Age: 18
Discharge: HOME | End: 2023-05-07
Attending: EMERGENCY MEDICINE
Payer: COMMERCIAL

## 2023-05-06 DIAGNOSIS — R55 SYNCOPE, UNSPECIFIED SYNCOPE TYPE: Primary | ICD-10-CM

## 2023-05-06 LAB
ALBUMIN SERPL-MCNC: 4 G/DL (ref 3.5–5.2)
ALBUMIN/GLOB SERPL: 1.3 G/DL
ALP SERPL-CCNC: 91 U/L (ref 39–117)
ALT SERPL W P-5'-P-CCNC: 10 U/L (ref 1–33)
ANION GAP SERPL CALCULATED.3IONS-SCNC: 10.5 MMOL/L (ref 5–15)
AST SERPL-CCNC: 13 U/L (ref 1–32)
BASOPHILS # BLD AUTO: 0.08 10*3/MM3 (ref 0–0.2)
BASOPHILS NFR BLD AUTO: 0.7 % (ref 0–1.5)
BILIRUB SERPL-MCNC: 0.3 MG/DL (ref 0–1.2)
BILIRUB UR QL STRIP: NEGATIVE
BUN SERPL-MCNC: 12 MG/DL (ref 6–20)
BUN/CREAT SERPL: 20.3 (ref 7–25)
CALCIUM SPEC-SCNC: 8.9 MG/DL (ref 8.6–10.5)
CHLORIDE SERPL-SCNC: 102 MMOL/L (ref 98–107)
CLARITY UR: CLEAR
CO2 SERPL-SCNC: 24.5 MMOL/L (ref 22–29)
COLOR UR: YELLOW
CREAT SERPL-MCNC: 0.59 MG/DL (ref 0.57–1)
DEPRECATED RDW RBC AUTO: 38.4 FL (ref 37–54)
EGFRCR SERPLBLD CKD-EPI 2021: 132.5 ML/MIN/1.73
EOSINOPHIL # BLD AUTO: 0.31 10*3/MM3 (ref 0–0.4)
EOSINOPHIL NFR BLD AUTO: 2.5 % (ref 0.3–6.2)
ERYTHROCYTE [DISTWIDTH] IN BLOOD BY AUTOMATED COUNT: 12.6 % (ref 12.3–15.4)
GLOBULIN UR ELPH-MCNC: 3.2 GM/DL
GLUCOSE SERPL-MCNC: 142 MG/DL (ref 65–99)
GLUCOSE UR STRIP-MCNC: NEGATIVE MG/DL
HCT VFR BLD AUTO: 29.6 % (ref 34–46.6)
HGB BLD-MCNC: 10.3 G/DL (ref 12–15.9)
HGB UR QL STRIP.AUTO: ABNORMAL
IMM GRANULOCYTES # BLD AUTO: 0.05 10*3/MM3 (ref 0–0.05)
IMM GRANULOCYTES NFR BLD AUTO: 0.4 % (ref 0–0.5)
KETONES UR QL STRIP: NEGATIVE
LEUKOCYTE ESTERASE UR QL STRIP.AUTO: NEGATIVE
LYMPHOCYTES # BLD AUTO: 3.07 10*3/MM3 (ref 0.7–3.1)
LYMPHOCYTES NFR BLD AUTO: 25.2 % (ref 19.6–45.3)
MCH RBC QN AUTO: 29.4 PG (ref 26.6–33)
MCHC RBC AUTO-ENTMCNC: 34.8 G/DL (ref 31.5–35.7)
MCV RBC AUTO: 84.6 FL (ref 79–97)
MONOCYTES # BLD AUTO: 0.84 10*3/MM3 (ref 0.1–0.9)
MONOCYTES NFR BLD AUTO: 6.9 % (ref 5–12)
NEUTROPHILS NFR BLD AUTO: 64.3 % (ref 42.7–76)
NEUTROPHILS NFR BLD AUTO: 7.82 10*3/MM3 (ref 1.7–7)
NITRITE UR QL STRIP: NEGATIVE
NRBC BLD AUTO-RTO: 0 /100 WBC (ref 0–0.2)
PH UR STRIP.AUTO: 6.5 [PH] (ref 5–8)
PLATELET # BLD AUTO: 439 10*3/MM3 (ref 140–450)
PMV BLD AUTO: 8.9 FL (ref 6–12)
POTASSIUM SERPL-SCNC: 3.7 MMOL/L (ref 3.5–5.2)
PROT SERPL-MCNC: 7.2 G/DL (ref 6–8.5)
PROT UR QL STRIP: NEGATIVE
RBC # BLD AUTO: 3.5 10*6/MM3 (ref 3.77–5.28)
SODIUM SERPL-SCNC: 137 MMOL/L (ref 136–145)
SP GR UR STRIP: 1.01 (ref 1–1.03)
TROPONIN T SERPL HS-MCNC: <6 NG/L
UROBILINOGEN UR QL STRIP: ABNORMAL
WBC NRBC COR # BLD: 12.17 10*3/MM3 (ref 3.4–10.8)

## 2023-05-06 PROCEDURE — 99284 EMERGENCY DEPT VISIT MOD MDM: CPT

## 2023-05-06 PROCEDURE — 85025 COMPLETE CBC W/AUTO DIFF WBC: CPT | Performed by: EMERGENCY MEDICINE

## 2023-05-06 PROCEDURE — 81001 URINALYSIS AUTO W/SCOPE: CPT | Performed by: EMERGENCY MEDICINE

## 2023-05-06 PROCEDURE — 93005 ELECTROCARDIOGRAM TRACING: CPT | Performed by: EMERGENCY MEDICINE

## 2023-05-06 PROCEDURE — 84484 ASSAY OF TROPONIN QUANT: CPT | Performed by: EMERGENCY MEDICINE

## 2023-05-06 PROCEDURE — 80053 COMPREHEN METABOLIC PANEL: CPT | Performed by: EMERGENCY MEDICINE

## 2023-05-06 RX ORDER — SODIUM CHLORIDE 0.9 % (FLUSH) 0.9 %
10 SYRINGE (ML) INJECTION AS NEEDED
Status: DISCONTINUED | OUTPATIENT
Start: 2023-05-06 | End: 2023-05-07 | Stop reason: HOSPADM

## 2023-05-06 RX ADMIN — SODIUM CHLORIDE 1000 ML: 9 INJECTION, SOLUTION INTRAVENOUS at 23:13

## 2023-05-07 VITALS
SYSTOLIC BLOOD PRESSURE: 101 MMHG | HEIGHT: 65 IN | WEIGHT: 153 LBS | OXYGEN SATURATION: 100 % | BODY MASS INDEX: 25.49 KG/M2 | TEMPERATURE: 98.5 F | DIASTOLIC BLOOD PRESSURE: 66 MMHG | HEART RATE: 87 BPM | RESPIRATION RATE: 16 BRPM

## 2023-05-07 LAB
BACTERIA UR QL AUTO: ABNORMAL /HPF
HOLD SPECIMEN: NORMAL
HOLD SPECIMEN: NORMAL
HYALINE CASTS UR QL AUTO: ABNORMAL /LPF
MUCOUS THREADS URNS QL MICRO: ABNORMAL /HPF
RBC # UR STRIP: ABNORMAL /HPF
REF LAB TEST METHOD: ABNORMAL
SQUAMOUS #/AREA URNS HPF: ABNORMAL /HPF
WBC # UR STRIP: ABNORMAL /HPF
WHOLE BLOOD HOLD COAG: NORMAL
WHOLE BLOOD HOLD SPECIMEN: NORMAL

## 2023-05-10 ENCOUNTER — OFFICE VISIT (OUTPATIENT)
Dept: OBSTETRICS AND GYNECOLOGY | Facility: CLINIC | Age: 18
End: 2023-05-10
Payer: COMMERCIAL

## 2023-05-10 VITALS
WEIGHT: 153 LBS | BODY MASS INDEX: 25.49 KG/M2 | SYSTOLIC BLOOD PRESSURE: 100 MMHG | HEIGHT: 65 IN | DIASTOLIC BLOOD PRESSURE: 56 MMHG

## 2023-05-10 DIAGNOSIS — Z09 POSTOPERATIVE FOLLOW-UP: Primary | ICD-10-CM

## 2023-05-10 DIAGNOSIS — O00.101 RIGHT TUBAL PREGNANCY WITHOUT INTRAUTERINE PREGNANCY: ICD-10-CM

## 2023-05-10 DIAGNOSIS — Z30.014 ENCOUNTER FOR INITIAL PRESCRIPTION OF INTRAUTERINE CONTRACEPTIVE DEVICE (IUD): ICD-10-CM

## 2023-05-10 NOTE — PROGRESS NOTES
"Postoperative Assessment Visit    Subjective   Chief Complaint   Patient presents with   • Post-op     Magnolia Palumbo is a 18 y.o.  female who presents for a post-op visit. She is s/p diagnostic laparoscopy and right salpingectomy on 23 for management of a right tubal ectopic pregnancy. She tolerated the procedure well and was discharged POD#0. She presents today for follow up.    She reports pain has been well controlled. She started her period a couple of days after surgery and bleeding lasted 5-6 days. She is tolerating PO, voiding spontaneously and having normal bowel movements. She would like to discuss birth control - she has previously tried depo. She is interested in Nexplanon vs IUD.     Objective   Vitals:    05/10/23 0956   Weight: 69.4 kg (153 lb)   Height: 165.1 cm (65\")     Physical Exam:  Gen: well appearing, NAD  Abd: soft, nontender  Incision(s): c/d/i, healing well, no evidence of infection, separation or drainage    Pathology:  DIAGNOSIS:   FALLOPIAN TUBE, ECTOPIC PREGNANCY, RIGHT:   Fallopian tube with ectopic pregnancy       Medical Decision Making:    I have reviewed the operative note. I have reviewed the pathology report.     Assessment & Plan       Diagnosis Plan   1. Postoperative follow-up        2. Right tubal pregnancy without intrauterine pregnancy           Medication Management: Discussed Nexplanon vs IUD, IUD ordered.    Patient is meeting post-op milestones.  Surgical findings and pathology reviewed.  Reviewed no intercourse x 2 weeks prior to insertion.    RTC for IUD insertion.    Kacie Briggs MD   Obstetrics and Gynecology  [facility]   "

## 2023-05-15 NOTE — ED PROVIDER NOTES
"Subjective  History of Present Illness:    Chief Complaint: Syncope, vomiting  History of Present Illness: 18-year-old female presents with syncope, states that she had not had anything to eat or drink before passing out, recent ectopic pregnancy surgery    Nurses Notes reviewed and agree, including vitals, allergies, social history and prior medical history.     REVIEW OF SYSTEMS: All systems reviewed and not pertinent unless noted.  Review of Systems      Positive for: Syncope, vomiting    Negative for: Shortness of breath cough hemoptysis palpitations edema    Past Medical History:   Diagnosis Date   • Anemia        Allergies:    Patient has no known allergies.      Past Surgical History:   Procedure Laterality Date   • DIAGNOSTIC LAPAROSCOPY Right 4/28/2023    Procedure: DIAGNOSTIC LAPAROSCOPY, right salpingectomy;  Surgeon: Kacie Briggs MD;  Location: [facility];  Service: Obstetrics;  Laterality: Right;         Social History     Socioeconomic History   • Marital status: Single   Tobacco Use   • Smoking status: Never   • Smokeless tobacco: Never   Vaping Use   • Vaping Use: Some days   Substance and Sexual Activity   • Alcohol use: Never   • Drug use: Never   • Sexual activity: Yes     Partners: Male     Birth control/protection: None         Family History   Problem Relation Age of Onset   • Hypertension Father    • Anemia Mother        Objective  Physical Exam:  /66   Pulse 87   Temp 98.5 °F (36.9 °C) (Oral)   Resp 16   Ht 165.1 cm (65\")   Wt 69.4 kg (153 lb)   LMP 05/06/2023 (Exact Date)   SpO2 100%   Breastfeeding Unknown   BMI 25.46 kg/m²      Physical Exam    CONSTITUTIONAL: Well developed, nontoxic healthy-appearing 18-year-old female,  in no acute distress.  VITAL SIGNS: per nursing, reviewed and noted  SKIN: exposed skin with no rashes, ulcerations or petechiae  EYES: Grossly EOMI, no icterus  ENT: Normal voice.  Moist mucous membranes   RESPIRATORY:  No increased work of breathing. " No retractions.   CARDIOVASCULAR:   Extremities pink and warm.  Good cap refill to extremities.   GI: Abdomen without distention   MUSCULOSKELETAL: Age-appropriate bulk and tone, moves all 4 extremities  NEUROLOGIC: Alert, oriented x 3. No gross deficits. GCS 15.   PSYCH: appropriate affect.  : no bladder tenderness or distention, no CVA tenderness    Procedures    ED Course:    Lab Results (last 24 hours)     ** No results found for the last 24 hours. **           No radiology results from the last 24 hrs     [facility]    ED Course as of 05/15/23 0355   Sat May 06, 2023   2150 EKG interpreted by me reveals sinus rhythm rate of 100 no ectopy no ischemic changes [PF]      ED Course User Index  [PF] Francesco Rojas,        Medical Decision Making:    Initial impression of presenting illness: 18-year-old female presents with syncope, states that she had not had anything to eat or drink before passing out, recent ectopic pregnancy surgery      DDX: includes but is not limited to: Symptomatic anemia, vasovagal syncope, less likely arrhythmia electrolyte derangement hypoglycemia dehydration         Patient arrives normotensive afebrile no tachycardia oxygen saturations 100% room air with vitals interpreted by myself.     Pertinent features from physical exam: Benign exam.    Initial diagnostic plan: IV fluids, basic labs including CBC CMP UA troponin EKG    Results from initial plan were reviewed and interpreted by me revealing nonischemic EKG with slight tachycardia, normal troponin.  Hemoglobin 10.3 hematocrit 29.6, nonactionable CMP.  Nonischemic EKG without ectopy    Diagnostic information from other sources: None    Interventions / Re-evaluation: Feeling better after interventions, monitor emergency department 5+ hours    Results/clinical rationale were discussed with patient    Consultations/Discussion of results with other physicians: None    Disposition plan: Patient was discharged in home stable condition.   Counseled on supportive care, outpatient follow-up. Return precaution discussed.  Patient/family was understanding and agreeable with plan    -----      Final diagnoses:   Syncope, unspecified syncope type        Francesco Rojas,   05/15/23 0358

## 2023-06-02 ENCOUNTER — OFFICE VISIT (OUTPATIENT)
Dept: OBSTETRICS AND GYNECOLOGY | Facility: CLINIC | Age: 18
End: 2023-06-02

## 2023-06-02 VITALS — BODY MASS INDEX: 24.49 KG/M2 | HEIGHT: 65 IN | WEIGHT: 147 LBS

## 2023-06-02 DIAGNOSIS — Z30.430 ENCOUNTER FOR IUD INSERTION: Primary | ICD-10-CM

## 2023-06-02 LAB
B-HCG UR QL: NEGATIVE
EXPIRATION DATE: NORMAL
INTERNAL NEGATIVE CONTROL: NEGATIVE
INTERNAL POSITIVE CONTROL: POSITIVE
Lab: NORMAL

## 2023-06-02 NOTE — PROGRESS NOTES
IUD Insertion    LMP 6/1/23    Date of procedure:  6/2/2023    Risks and benefits discussed? Yes  All questions answered? Yes  Consents given by The patient  Written consent obtained? Yes    Local anesthesia used:  No    Procedure documentation:    After verifying the patient had a low probability of being pregnant and met the criteria for insertion, a sterile speculum has placed and the cervix was cleansed with an antiseptic solution. Vaginal discharge was scant. The anterior lip of the cervix was grasped with a single tooth tenaculum and the uterine cavity was gently sounded. There was moderate difficulty passing the sound through the cervix. Cervical dilation did not need to be performed prior to placing the IUD. The uterus sounded to 8 cms. The Mirena was then prepared per the manufacturers instructions.    The Mirena was advanced to a point 1 cm from the fundus and then the arms were released from the sheath. The device was advanced to the fundus and the device was released fully from the sheath. The string was cut 4 cms in length. Bleeding from the cervix was scant.    She tolerated the procedure without any difficulty.     Post procedure instructions: It was reviewed that the Mirena will not alter the timing of when she bleeds but it may decrease the quantity of flow and cramps.  Roughly 30% of people will be amenorrheic over time.  Efficacy rate of 99.2% over 5 years. Spontaneous expulsion rate of 1-2%. If she has any issue with fever or excessive bleeding or pain she is to call the office immediately.       RTC: 4-5 weeks for string check    Kacie Briggs MD   Obstetrics and Gynecology  [facility]

## 2023-08-16 ENCOUNTER — OFFICE VISIT (OUTPATIENT)
Dept: OBSTETRICS AND GYNECOLOGY | Facility: CLINIC | Age: 18
End: 2023-08-16
Payer: COMMERCIAL

## 2023-08-16 VITALS
SYSTOLIC BLOOD PRESSURE: 116 MMHG | HEIGHT: 65 IN | BODY MASS INDEX: 25.16 KG/M2 | WEIGHT: 151 LBS | DIASTOLIC BLOOD PRESSURE: 62 MMHG

## 2023-08-16 DIAGNOSIS — Z30.431 IUD CHECK UP: Primary | ICD-10-CM

## 2023-08-16 NOTE — PROGRESS NOTES
"IUD String Check    Chief Complaint   Patient presents with    Follow-up       Magnolia Palumbo is a 18 y.o.  female who presents for an IUD string check. She is s/p Mirena IUD insertion on 23.    She has no complaints today. Bleeding has been lighter than prior to the IUD, however it does linger for longer. She reports no pain. She has attempted intercourse since insertion and initially there was concern that her partner could feel the strings, however this is no longer a concern.    Vitals:    23 1328   BP: 116/62   Weight: 68.5 kg (151 lb)   Height: 165.1 cm (65\")       PHYSICAL EXAM   General appearance: well nourished, well hydrated, no acute distress  Cervix: Normal appearance, IUD strings present    IMPRESSION/PLAN:    IUD in appropriate position    RTC for annual w/ Pap in 2024    Coding/billing based on time: 10 total minutes were required for this encounter.    Kacie Briggs MD   Obstetrics and Gynecology  [facility]   "

## 2024-03-20 ENCOUNTER — OFFICE VISIT (OUTPATIENT)
Dept: OBSTETRICS AND GYNECOLOGY | Facility: CLINIC | Age: 19
End: 2024-03-20
Payer: COMMERCIAL

## 2024-03-20 VITALS
SYSTOLIC BLOOD PRESSURE: 94 MMHG | WEIGHT: 155 LBS | HEIGHT: 65 IN | BODY MASS INDEX: 25.83 KG/M2 | DIASTOLIC BLOOD PRESSURE: 72 MMHG

## 2024-03-20 DIAGNOSIS — Z01.419 WELL WOMAN EXAM: Primary | ICD-10-CM

## 2024-03-20 DIAGNOSIS — L90.5 SCAR OF ABDOMINAL SKIN: ICD-10-CM

## 2024-03-20 DIAGNOSIS — S00.452A EMBEDDED EARRING OF LEFT EAR, INITIAL ENCOUNTER: ICD-10-CM

## 2024-03-20 NOTE — PROGRESS NOTES
Annual Well Woman Visit    Subjective   Chief Complaint   Patient presents with    Gynecologic Exam     Annual and pap today. No complaints     Magnolia Palumbo is a 19 y.o.  presenting to be seen for an annual well woman visit. She reports she is doing well. Denies any GYN concerns today. She gets monthly menses that are shorter than her baseline prior to IUD insertion. The bleeding is somewhat heavy the first day but tapers quickly over the following day. Bleeding typically lasts 2 days and is not associated with significant pain. She reports increased tenderness/ sensitivity with intercourse when approaching her menses. Denies urinary symptoms/ concerns. She does report that one of her laparoscopic incisions from her ectopic pregnancy surgery has healed abnormally - she believes it is due to accidentally brushing the scab off before it was healed. She also reports concerns for a retained earring back in her left earlobe.     OB Hx:  - h/o ectopic x 1  Contraception: IUD  Pap smear: never, completed today  Mammogram: N/A  Colonoscopy: N/A  DEXA Scan: N/A    Past Medical History:   Diagnosis Date    Anemia     Ectopic pregnancy      Past Surgical History:   Procedure Laterality Date    DIAGNOSTIC LAPAROSCOPY Right 2023    Procedure: DIAGNOSTIC LAPAROSCOPY, right salpingectomy;  Surgeon: Kacie Briggs MD;  Location: [facility];  Service: Obstetrics;  Laterality: Right;     Family History   Problem Relation Age of Onset    Hypertension Father     Anemia Mother      Social History     Tobacco Use    Smoking status: Never    Smokeless tobacco: Never   Vaping Use    Vaping status: Former   Substance Use Topics    Alcohol use: Never    Drug use: Never     (Not in a hospital admission)    Patient has no known allergies.  Current Outpatient Medications on File Prior to Visit   Medication Sig Dispense Refill    Levonorgestrel (MIRENA) 20 MCG/DAY intrauterine device IUD 1 each by Intrauterine route.       No  "current facility-administered medications on file prior to visit.     Social History    Tobacco Use      Smoking status: Never      Smokeless tobacco: Never    Review of Systems  Pertinent items are noted in HPI, all other systems were reviewed and negative     Objective   BP 94/72   Ht 165.1 cm (65\")   Wt 70.3 kg (155 lb)   BMI 25.79 kg/m²     Physical Exam:  General Appearance: alert, interactive, and cooperative  HEENT: earring back seen/ palpated just under skin surface of left posterior earlobe   Breasts:   Examined in supine position  Symmetric without masses or skin dimpling  Nipples normal without inversion, lesions or discharge  There are no palpable axillary nodes  Abdomen: no masses, soft, non-tender, no guarding and no rebound tenderness. RLQ laparoscopic incision with small keloid/ raised scar tissue, non-tender.  Pelvis:  Pelvic: Clinical staff was present for exam  External genitalia:  normal appearance of the external genitalia including Bartholin's and Dorchester's glands  :  urethral meatus normal  Vagina:  normal pink mucosa without lesions  Cervix:  normal appearance, IUD strings present  Uterus:  normal size, shape and consistency, non-tender  Adnexa:  normal bimanual exam of the adnexa, no masses or tenderness       Assessment & Plan    Annual well woman exam with age appropriate screening      Diagnosis Plan   1. Well woman exam  LIQUID-BASED PAP SMEAR WITH HPV GENOTYPING IF ASCUS (RAFAEL,COR,[facility]AD)      2. Embedded earring of left ear, initial encounter  Ambulatory Referral to Plastic Surgery      3. Scar of abdominal skin          Medications ordered: None    Procedures performed: Pap    - Mammogram: Not indicated   - Pap screening guidelines reviewed; pap smear completed today  - Yearly clinical breast and pelvic exams recommended regardless of pap recommendations  - Healthy diet and exercise encouraged  - Contraception: IUD  - Screening: DELVIN/ZAHRA on Pap  - Plastics referral placed for retained " earring back  - Laparoscopic incision healed with pronounced scar in RLQ - discussed that if she requires laparoscopic surgery in the future, she can request scar revision at that time.    Follow up for annual visit or sooner with any concerns.    Kacie Briggs MD  Obstetrics and Gynecology  [facility]

## 2024-03-23 LAB — REF LAB TEST METHOD: NORMAL

## 2024-08-29 ENCOUNTER — OFFICE VISIT (OUTPATIENT)
Dept: OBSTETRICS AND GYNECOLOGY | Facility: CLINIC | Age: 19
End: 2024-08-29
Payer: COMMERCIAL

## 2024-08-29 VITALS
WEIGHT: 144.4 LBS | SYSTOLIC BLOOD PRESSURE: 110 MMHG | BODY MASS INDEX: 24.06 KG/M2 | DIASTOLIC BLOOD PRESSURE: 72 MMHG | HEIGHT: 65 IN

## 2024-08-29 DIAGNOSIS — Z97.5 BREAKTHROUGH BLEEDING WITH IUD: ICD-10-CM

## 2024-08-29 DIAGNOSIS — Z30.431 IUD CHECK UP: Primary | ICD-10-CM

## 2024-08-29 DIAGNOSIS — N92.1 BREAKTHROUGH BLEEDING WITH IUD: ICD-10-CM

## 2024-08-29 DIAGNOSIS — R63.4 WEIGHT LOSS: ICD-10-CM

## 2024-08-29 PROCEDURE — 99213 OFFICE O/P EST LOW 20 MIN: CPT | Performed by: STUDENT IN AN ORGANIZED HEALTH CARE EDUCATION/TRAINING PROGRAM

## 2024-08-29 RX ORDER — IBUPROFEN 400 MG/1
TABLET, FILM COATED ORAL
COMMUNITY
Start: 2024-06-01

## 2024-08-29 RX ORDER — PSEUDOEPHED/ACETAMINOPH/DIPHEN 30MG-500MG
TABLET ORAL
COMMUNITY
Start: 2024-06-01

## 2024-08-29 RX ORDER — LIDOCAINE 50 MG/G
OINTMENT TOPICAL
COMMUNITY
Start: 2024-06-01

## 2024-08-29 NOTE — PROGRESS NOTES
GYN Office Visit    Subjective   Chief Complaint   Patient presents with    Follow-up     Patient complains of bleeding with IUD. TVUS done today. Patient states she has had bleeding 4-5 days like a normal cycle in August. Patient states it was not heavy bleeding.     Magnolia Palumbo is a 19 y.o.  presenting to be evaluated for bleeding with the IUD. She had a Mirena IUD placed about one year ago and was previously amenorrheic. At the beginning of this month, she had 4-5 days of light to moderate flow with some cramping, similar to a period she would have without an IUD. This concerned her that something was wrong with the IUD. She also reports a 10 lb unintentional weight loss in the past, which further prompted concern that she was having health effects from the IUD. Denies further bleeding or any other associated symptoms. She does note increased stress at work with changes in her eating patterns.    OB Hx:   Pap smear: 2024, NILM  Mammogram: N/A  Colonoscopy: N/A  DEXA Scan: N/A    Past Medical History:   Diagnosis Date    Anemia     Ectopic pregnancy      Past Surgical History:   Procedure Laterality Date    DIAGNOSTIC LAPAROSCOPY Right 2023    Procedure: DIAGNOSTIC LAPAROSCOPY, right salpingectomy;  Surgeon: Kacie Briggs MD;  Location: [facility];  Service: Obstetrics;  Laterality: Right;     Family History   Problem Relation Age of Onset    Hypertension Father     Anemia Mother      Social History     Tobacco Use    Smoking status: Never    Smokeless tobacco: Never   Vaping Use    Vaping status: Former   Substance Use Topics    Alcohol use: Never    Drug use: Never     No Known Allergies    Current Outpatient Medications on File Prior to Visit   Medication Sig Dispense Refill    Acetaminophen Extra Strength 500 MG tablet       ibuprofen (ADVIL,MOTRIN) 400 MG tablet       lidocaine (XYLOCAINE) 5 % ointment Apply to laceration 3-4 times a day as needed for pain      Levonorgestrel (MIRENA)  "20 MCG/DAY intrauterine device IUD 1 each by Intrauterine route.       No current facility-administered medications on file prior to visit.     Social History    Tobacco Use      Smoking status: Never      Smokeless tobacco: Never       Objective   /72   Ht 165.1 cm (65\")   Wt 65.5 kg (144 lb 6.4 oz)   LMP 08/13/2024   Breastfeeding No   BMI 24.03 kg/m²     Physical Exam:  General Appearance: alert, interactive, and NAD     Medical Decision Making:    I reviewed Magnolia's recent ER encounter 06/2024 - straddle injury.     Latest Reference Range & Units 06/01/24 17:50   WBC 3.70 - 10.30 10*3/uL 14.81 (H) (E)   RBC 3.90 - 5.20 10*6/uL 4.29 (E)   Hemoglobin 11.2 - 15.7 g/dL 13.1 (E)   Hematocrit 34.0 - 45.0 % 37.0 (E)   Platelets 155 - 369 10*3/uL 256 (E)   RDW 11.5 - 14.5 % 12.6 (E)   MCV 79 - 98 fL 86 (E)   MCH 26.0 - 32.0 pg 30.5 (E)   MCHC 30.7 - 35.5 g/dL 35.4 (E)   MPV 8.8 - 12.5 fL 10.1 (E)   (H): Data is abnormally high  (E): External lab result    Independent interpretation of tests:  US performed today -   Anteverted uterus measuring 8.7 x 4.5 x 3.3 cm without any masses seen. The endometrium is thin, measuring approximately 3.1 mm. An intrauterine device is present and appears appropriately positioned at the fundus. The bilateral ovaries are normal in appearance with normal vascular flow. The left ovary contains a 2.3 cm hemorrhagic dominant follicle. No other adnexal masses seen. Trace free fluid in the cul de sac.     Assessment & Plan      Diagnosis Plan   1. IUD check up        2. Breakthrough bleeding with IUD        3. Weight loss           Medication Management: None    Procedures Performed: None    We reviewed the US images, which demonstrate normal positioning of the IUD. Discussed that it is not uncommon for women to have some bleeding with the IUD in place. Return precautions reviewed, including heavy bleeding, severe pain and frequent bleeding. Light, self-limited episodes ok to monitor. " Regarding her weight loss, we discussed that this is unlikely to be related to her IUD and likely related to stress at work/ changes in her eating habits. Advised to incorporate nutrient dense foods in her diet. Current BMI is healthy and therefore no need for acute intervention, however if she continues to lose weight rapidly she should consider seeing her PCP/ a nutritionist. All questions answered.    RTC for annual exam or sooner CAIT Briggs MD  Obstetrics and Gynecology  [facility]

## (undated) DEVICE — SLV SCD CALF HEMOFORCE DVT THERP REPROC MD

## (undated) DEVICE — SOL IRR NACL 0.9PCT BT 1000ML

## (undated) DEVICE — ENDOPATH XCEL BLADELESS TROCARS WITH STABILITY SLEEVES: Brand: ENDOPATH XCEL

## (undated) DEVICE — GLV SURG SENSICARE POLYISPRN W/ALOE PF LF 6.5 GRN STRL

## (undated) DEVICE — DRSNG WND GZ PAD BORDERED LF 4X5IN STRL

## (undated) DEVICE — RICH LAVH: Brand: MEDLINE INDUSTRIES, INC.

## (undated) DEVICE — DISPOSABLE MONOPOLAR ENDOSCOPIC CORD 10 FT. (3M): Brand: KIRWAN

## (undated) DEVICE — KT ANTI FOG W/FLD AND SPNG

## (undated) DEVICE — HARMONIC 1100 SHEARS, 36CM SHAFT LENGTH: Brand: HARMONIC

## (undated) DEVICE — TP ELECTRD LAP L WR SPLIT33CM

## (undated) DEVICE — ENDOPOUCH RETRIEVER SPECIMEN RETRIEVAL BAGS: Brand: ENDOPOUCH RETRIEVER

## (undated) DEVICE — NDL HYPO ECLPS SFTY 25G 1 1/2IN

## (undated) DEVICE — TBG PENCL TELESCP MEGADYNE SMOKE EVAC 10FT

## (undated) DEVICE — ADHS SKIN PREMIERPRO EXOFIN TOPICAL HI/VISC .5ML

## (undated) DEVICE — ENDOPATH XCEL UNIVERSAL TROCAR STABLILITY SLEEVES: Brand: ENDOPATH XCEL

## (undated) DEVICE — HDRST POSTN SLOTTED A/

## (undated) DEVICE — ANTIBACTERIAL UNDYED BRAIDED (POLYGLACTIN 910), SYNTHETIC ABSORBABLE SUTURE: Brand: COATED VICRYL

## (undated) DEVICE — PAD TRENDELENBURG W/RAIL STRAP 44X23X1IN

## (undated) DEVICE — GLV SURG ULTRATOUCH BIOGEL/COAT PF LF SZ6 STRL

## (undated) DEVICE — SUT MNCRYL PLS ANTIB UD 3/0 PS2 27IN

## (undated) DEVICE — SYR LL TP 10ML STRL